# Patient Record
(demographics unavailable — no encounter records)

---

## 2025-05-01 NOTE — PHYSICAL EXAM
[Normal] : normal rate, regular rhythm, normal S1 and S2 and no murmur heard [de-identified] : LLL scant crackles

## 2025-05-01 NOTE — PLAN
[FreeTextEntry1] : s/p pneumonia- finished Zpack, cont Cefdinir Mucinex DM to help break up phlegm f/u CXR in 6 weeks Will give Prevnar after CXR

## 2025-05-01 NOTE — HISTORY OF PRESENT ILLNESS
[FreeTextEntry8] : Pt went to Urgent Care last week with a cough and had a CXR which showed pneumonia in LLL. Pt was given antibiotics- Cefdinir for 10 days and Zpack. Pt is feeling better but still has cough. Tried cough syrup and tried Claritin.

## 2025-07-15 NOTE — HISTORY OF PRESENT ILLNESS
[FreeTextEntry1] : Pt here for follow up. Had pneumonia in April and had CT chest as a follow up on 7/1/25. The pneumonia has resolved but there was a R thyroid nodule that was seen.  Pt also c/o knee pain that is chronic as well as L ankle pain. Would like to go to PT.

## 2025-07-15 NOTE — PLAN
[FreeTextEntry1] : Thyroid nodule seen on CT chest (reviewed CT chest result from pt's phone)- ref for thyroid sonogram Knee and ankle pain- ref PT